# Patient Record
Sex: FEMALE | Race: WHITE | NOT HISPANIC OR LATINO | Employment: OTHER | URBAN - METROPOLITAN AREA
[De-identification: names, ages, dates, MRNs, and addresses within clinical notes are randomized per-mention and may not be internally consistent; named-entity substitution may affect disease eponyms.]

---

## 2021-05-27 ENCOUNTER — RECORDS - HEALTHEAST (OUTPATIENT)
Dept: ADMINISTRATIVE | Facility: CLINIC | Age: 76
End: 2021-05-27

## 2022-03-11 ENCOUNTER — TELEPHONE (OUTPATIENT)
Dept: GASTROENTEROLOGY | Facility: CLINIC | Age: 77
End: 2022-03-11
Payer: COMMERCIAL

## 2022-03-11 NOTE — TELEPHONE ENCOUNTER
[facility] Call Center    Phone Message    May a detailed message be left on voicemail: yes     Reason for Call: Other:       Pt called into schedule a consult for a fecal transplant based on having multiple C. Diff cases.    Please review and call back to schedule.    Action Taken: Message routed to:  [facility] GI    Travel Screening: Not Applicable

## 2022-03-12 NOTE — TELEPHONE ENCOUNTER
Per review of chart, Positive C.diff on 8/23/2021 and 9/22/2021.  No referral on file in system, however, review of Care Everywhere notes recommendation by Infectious Disease provider, Amrita Pfeiffer MD to see Dr. Saldaña for consideration of IMT.      Yumi De Jesus RN

## 2022-03-14 ENCOUNTER — TELEPHONE (OUTPATIENT)
Dept: GASTROENTEROLOGY | Facility: CLINIC | Age: 77
End: 2022-03-14
Payer: COMMERCIAL

## 2022-03-14 NOTE — TELEPHONE ENCOUNTER
Patient contacted, clarified that the upcoming visit is a consult only to determine if she is a candidate for IMT.  Advised that if it is determined that she is a good candidate, Dr. Saldaña would advise on the method to proceed - via colonoscopy or capsule ingestion.  Patient is wondering about insurance coverage and cost.   Informed patient that this writer is not aware of CPT codes, etc... but once it is determined if/how to proceed that more information could be advised on cost.  Patient verbalizes understanding and has no further questions at this time.        Yumi De Jesus RN

## 2022-03-14 NOTE — TELEPHONE ENCOUNTER
Patient contacted, confirms that she has tested positive x 2 for C.diff, but has had several recurrences since that she has been treated for, though no additional testing has been completed.  Patient scheduled for video visit at next available on 5/18/2022 at 1 PM.      Yumi De Jesus RN

## 2022-03-14 NOTE — TELEPHONE ENCOUNTER
[facility] Call Center    Phone Message    May a detailed message be left on voicemail: yes     Reason for Call: Other:     Glenys is calling with questions in regards to her procedure.  Please call back to discuss    Action Taken: Message routed to:  Adult Clinics: Gastroenterology (GI) p [id number]    Travel Screening: Not Applicable

## 2022-04-02 ENCOUNTER — HEALTH MAINTENANCE LETTER (OUTPATIENT)
Age: 77
End: 2022-04-02

## 2022-04-07 ASSESSMENT — ENCOUNTER SYMPTOMS
DYSPNEA ON EXERTION: 0
SPUTUM PRODUCTION: 1
POSTURAL DYSPNEA: 0
JAUNDICE: 0
SNORES LOUDLY: 0
BLOOD IN STOOL: 0
BLOATING: 0
DIARRHEA: 1
HEMOPTYSIS: 0
WHEEZING: 0
BOWEL INCONTINENCE: 0
COUGH DISTURBING SLEEP: 1
DIFFICULTY URINATING: 0
VOMITING: 0
FLANK PAIN: 0
ABDOMINAL PAIN: 1
DYSURIA: 0
HEMATURIA: 0
SHORTNESS OF BREATH: 0
HEARTBURN: 0
RECTAL PAIN: 0
COUGH: 1
NAUSEA: 0
CONSTIPATION: 0

## 2022-04-13 ENCOUNTER — VIRTUAL VISIT (OUTPATIENT)
Dept: GASTROENTEROLOGY | Facility: CLINIC | Age: 77
End: 2022-04-13
Payer: COMMERCIAL

## 2022-04-13 DIAGNOSIS — A04.71 RECURRENT CLOSTRIDIOIDES DIFFICILE DIARRHEA: ICD-10-CM

## 2022-04-13 DIAGNOSIS — R19.7 DIARRHEA, UNSPECIFIED TYPE: Primary | ICD-10-CM

## 2022-04-13 PROCEDURE — 99205 OFFICE O/P NEW HI 60 MIN: CPT | Mod: GT | Performed by: INTERNAL MEDICINE

## 2022-04-13 NOTE — PROGRESS NOTES
Glenys is a 76 year old who is being evaluated via a billable video visit.      How would you like to obtain your AVS? MyChart  If the video visit is dropped, the invitation should be resent by: Text to cell phone: [phone]  Will anyone else be joining your video visit? No

## 2022-04-13 NOTE — PROGRESS NOTES
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  She first developed C. difficile infection in 08/2021, which followed treatment of sinusitis with Augmentin in 07/2021.  She was treated with vancomycin, which helped, and suffered a relapse in September, once again treated with vancomycin.  Since that time, she said she had a total of 4 recurrences, although 3 out of these 4 were diagnosed based on clinical symptoms alone and not confirmed with laboratory testing.  Most recent laboratory test done on 03/29 was negative for C. difficile.  At that time, she was not taking vancomycin, although in reviewing the notes, she probably was off of it for a very short period of time.  In addition to vancomycin taper, she has been on rifaximin for 20 days.  The only other exposure to antibiotics that I see was actually that recent visit at the end of March to the Emergency Room during which time she was diagnosed with a urinary tract infection and she was given Macrobid.  At this time, the patient is off of treatment for C. difficile for 2 full weeks and she has regular bowel movements, 1 bowel movement per day.  It is noted that her diarrhea associated with C. difficile infection was actually not particularly severe.  She had 2-3 loose bowel movements per day characterized by extreme urgency, but she denies fecal incontinence.    PAST MEDICAL HISTORY:  Notable for breast cancer.  She is status post left mastectomy.  She uses tobacco.  She has hypertension.  She has history of osteopenia.  Fibromyalgia is listed on her list of diagnoses; however, she says that has resolved many years ago.  The patient has history of diverticulitis and is status post partial colon resection in 2003.  Infections are rare in her.  She says she has not been on an antibiotic for years before this Augmentin in 07/2021.    PHYSICAL  EXAMINATION:  Done over video.  She appears to be in no acute distress.  Her speech is articulate and clear.  Visible skin is normal.    ASSESSMENT AND PLAN:  At this time, I do not see any need for further intervention.  She should have a standing order for C. difficile testing.  If the symptoms do come back, she would be placed back on vancomycin and I would advocate then for an intestinal microbiota transplant.  That we would do by way of oral capsules.  The patient had a colonoscopy back in 2017.  She is not interested in another colonoscopy at this time.  Oral capsules are nearly equivalent in efficacy to colonoscopic administration of microbiota transplant.  Most relapses when they do happen occur during the second week, so it is very encouraging that at 2 weeks she has normal bowel movements.  However, we hesitate to proclaim cure until 1-2 months after the discontinuation of antibiotics.  General recommendations to facilitate recovery in optimal health of the gut microbiome are nutritional.  We encourage diverse sources of whole grains, colored vegetables, leafy greens.  We do not recommend probiotics in capsules; however, fermented foods that may contain probiotic organisms are recommended and have been shown to enhance microbial diversity in the gut.  In contrast, probiotics contain in the capsule preparations may actually delay microbiome recovery and should generally be avoided.  Unfortunately, science on probiotics is very sparse and with such great number of different preparations available on the market, it is difficult to generalize.    TOTAL TIME SPENT IN CONSULTATION:  1 hour 10 minutes.  This includes review of medical records, documentation, and coordination of care.    Braeden Saldaña MD        D: 2022   T: 2022   MT: ARIANE    Name:     MICHELLE MARTINO  MRN:      [id number]        Account:    [id number]   :      1945           Visit Date: 2022     Document:  [id number]

## 2022-05-06 ENCOUNTER — TELEPHONE (OUTPATIENT)
Dept: GASTROENTEROLOGY | Facility: CLINIC | Age: 77
End: 2022-05-06
Payer: COMMERCIAL

## 2022-05-06 DIAGNOSIS — R19.7 DIARRHEA, UNSPECIFIED TYPE: Primary | ICD-10-CM

## 2022-05-06 NOTE — TELEPHONE ENCOUNTER
Patient's message:    Jordy Saldaña,  We had a video appointment on April 13.    I have been feeling quite well since then.  No symptoms of C-Diff until yesterday, May 5.    Had severe abdominal pain.    No diarrhea.  Today pain is much less and feel well but did have 2 small diarrhea episodes.  I am very dismayed about this seeming recurrence of C- Diff.   Not sure if I need to do anything yet like go back on vancomycin or have a stool sample checked out.   Or do I wait and see if it gets better?   If I need a sample analyzed I would like the order sent to [facility] in [place] and any prescription sent to [facility] in [place].   Please advise what you think I should do , or just wait .   Please advise.   Thank you  Glenys Schmitz  [phone]  Sent from my iPhone    She needs to get her stool tested.    Braeden Saldaña MD

## 2022-05-09 ENCOUNTER — DOCUMENTATION ONLY (OUTPATIENT)
Dept: GASTROENTEROLOGY | Facility: CLINIC | Age: 77
End: 2022-05-09
Payer: COMMERCIAL

## 2022-05-09 NOTE — PROGRESS NOTES
Lab orders sent to [facility] lab ([phone]) last Friday, 5/6/2022.   Staff message sent from Dr. Saldaña today stating fax was not received.   Called [facility] lab - confirmed fax number and re-faxed orders to [phone].       Demetria Andrade RN

## 2022-05-10 NOTE — PROGRESS NOTES
Called [facility] lab to verify that lab orders were received.   Staff confirmed that orders were received.     Demetria Andrade RN

## 2022-05-12 ENCOUNTER — TELEPHONE (OUTPATIENT)
Dept: GASTROENTEROLOGY | Facility: CLINIC | Age: 77
End: 2022-05-12
Payer: COMMERCIAL

## 2022-05-12 NOTE — TELEPHONE ENCOUNTER
Lab results for C diff and Calprotectin done on 5/10/2022. Care Everywhere updated.   Forwarding to provider for review. Staff message also sent.     Demetria Andrade RN

## 2022-07-15 ENCOUNTER — TELEPHONE (OUTPATIENT)
Dept: GASTROENTEROLOGY | Facility: CLINIC | Age: 77
End: 2022-07-15

## 2022-07-15 DIAGNOSIS — R19.7 DIARRHEA, UNSPECIFIED TYPE: Primary | ICD-10-CM

## 2022-07-15 NOTE — TELEPHONE ENCOUNTER
Braeden Saldaña MD  P [facility]enter[facility]-Adult[facility]  Please fax the stool test orders to the [facility] in [place] and let the patient know that they are faxed.     I recommended her to go to the ER. However, if she prefers to submit a stool test, their lab should call me during the weekend with the results..     Contacted [facility], provided with fax number of [phone].  Faxed ordered stool tests per provider request with provider number to contact with results as available over the weekend. Patient contacted, states she will do this.  Patient advised if the lab claims to have not received the orders or if unable to tolerate PO intake or if suspecting dehydration to be evaluated at the ER.  Patient verbalizes understanding and agrees.    Yumi De Jesus RN

## 2022-07-15 NOTE — TELEPHONE ENCOUNTER
"Patient's message:    \"Hello.   I have been very sick the past 48 hours with an apparent return of c-diff.   Havent been able to eat or drink more than a few ounces of water. Raging diarrhea and vomiting.   I think I need to at least get stool test done again.   Having lots of pain as well.  I ve been fine since April but I m sure c- diff is back.   If you could order the test pleas send to [facility] in [place].   Let me know what you think and what I can do.    I m miserable.     Thank you  Glenys Schmitz.\"     Stool tests are ordered. However, the patient is advised to go to the ER given severity of symptoms.    Braeden Saldaña MD  "

## 2022-07-16 ENCOUNTER — TELEPHONE (OUTPATIENT)
Dept: GASTROENTEROLOGY | Facility: CLINIC | Age: 77
End: 2022-07-16

## 2022-07-16 DIAGNOSIS — A04.71 RECURRENT CLOSTRIDIOIDES DIFFICILE DIARRHEA: Primary | ICD-10-CM

## 2022-07-16 RX ORDER — VANCOMYCIN HYDROCHLORIDE 125 MG/1
125 CAPSULE ORAL 4 TIMES DAILY
Qty: 32 CAPSULE | Refills: 0 | Status: SHIPPED | OUTPATIENT
Start: 2022-07-16

## 2022-07-16 NOTE — TELEPHONE ENCOUNTER
The patient reports that the C. Diff test was positive. She is started back on vancomycin and will be receiving IMT via oral capsules after that.

## 2022-08-06 DIAGNOSIS — A04.71 RECURRENT CLOSTRIDIOIDES DIFFICILE DIARRHEA: Primary | ICD-10-CM

## 2022-08-06 RX ORDER — VANCOMYCIN HYDROCHLORIDE 125 MG/1
125 CAPSULE ORAL 4 TIMES DAILY
Qty: 40 CAPSULE | Refills: 0 | Status: SHIPPED | OUTPATIENT
Start: 2022-08-06

## 2022-08-16 ENCOUNTER — ALLIED HEALTH/NURSE VISIT (OUTPATIENT)
Dept: GASTROENTEROLOGY | Facility: CLINIC | Age: 77
End: 2022-08-16
Payer: COMMERCIAL

## 2022-08-16 DIAGNOSIS — A04.71 RECURRENT CLOSTRIDIOIDES DIFFICILE DIARRHEA: Primary | ICD-10-CM

## 2022-08-16 PROCEDURE — 99207 PR NO CHARGE LOS: CPT | Performed by: INTERNAL MEDICINE

## 2022-08-17 NOTE — PROGRESS NOTES
Home visit conducted on 8/16/22 to facilitate Intestinal Microbiota Transplant via oral capsules.  Instructions and clinical consent form were reviewed at length; pt signed the ICF and expressed understanding of the instructions. Last dose vancomycin 8/13; plan for capsule ingestion 8/16/2022    Donor ID 70, 9.0 x 10^11 cells contained in 5 enteric capsules. Lot prepared 11/18/20    Plan for follow up call in 1-2 weeks.

## 2022-08-26 ENCOUNTER — TELEPHONE (OUTPATIENT)
Dept: GASTROENTEROLOGY | Facility: CLINIC | Age: 77
End: 2022-08-26

## 2022-08-26 DIAGNOSIS — A04.71 RECURRENT CLOSTRIDIOIDES DIFFICILE DIARRHEA: Primary | ICD-10-CM

## 2022-08-26 RX ORDER — VANCOMYCIN HYDROCHLORIDE 125 MG/1
125 CAPSULE ORAL 4 TIMES DAILY
Qty: 40 CAPSULE | Refills: 0 | Status: SHIPPED | OUTPATIENT
Start: 2022-08-26

## 2022-08-26 NOTE — TELEPHONE ENCOUNTER
[facility] Call Center    Phone Message    May a detailed message be left on voicemail: yes     Reason for Call: Other: Patient was returning a phone call from Demetria PITTMAN and is requesting a call back to discuss the stool sample test she has been requesting. Please call her back at [phone], thank you!     Action Taken: Message routed to:  Other: Demetria Andrade RN    Travel Screening: Not Applicable

## 2022-08-26 NOTE — TELEPHONE ENCOUNTER
Positive C.diff results received.  Patient contacted, states she did quite well for the first 8 days or so after IMT, and prior to the IMT was having normal stool patters while on Vancomycin.  Starting Wednesday night has been having 12-13 loose, watery stools each day and today has had 12 or so thus far.   Ok to treat with Vancomycin 125 mg four times daily per Dr. Rosen.  Patient advised that Dr. Saldaña will determine plan moving forward when he returns.      Rx to patient's preferred pharmacy.     Yumi De Jesus RN

## 2022-08-26 NOTE — TELEPHONE ENCOUNTER
Spoke with patient - state that she has had loose, watery stools since middle of the night on Wednesday along with cramping and pain. No blood noted in the stools. Not much of appetite, but did have some toast this AM.   IMT on 8/16/2022.   Writer will place orders for Cdiff and calprotectin per protocol.   Patient states she will go to the lab at [facility] in [place] (fax: [phone]).     Demetria Andrade RN

## 2022-08-28 DIAGNOSIS — A04.71 RECURRENT CLOSTRIDIOIDES DIFFICILE DIARRHEA: ICD-10-CM

## 2022-08-28 RX ORDER — VANCOMYCIN HYDROCHLORIDE 125 MG/1
125 CAPSULE ORAL 4 TIMES DAILY
Qty: 56 CAPSULE | Refills: 0 | Status: SHIPPED | OUTPATIENT
Start: 2022-08-28

## 2022-08-29 NOTE — TELEPHONE ENCOUNTER
Per review of chart, Dr. Saldaña has placed another Vancomycin order and has ordered a Zinplava infusion.  Message has been sent to [employer] [employer] for Zinplava.      Yumi De Jesus RN

## 2022-09-19 ENCOUNTER — TELEPHONE (OUTPATIENT)
Dept: GASTROENTEROLOGY | Facility: CLINIC | Age: 77
End: 2022-09-19

## 2022-09-19 NOTE — TELEPHONE ENCOUNTER
"Received notification from infusion Stephane that patients Zinplava infusion has been approved. LPN spoke with patient and notified her that the infusion has been approved, however, patient needs to schedule the infusion before Dr. Saldaña can give further instructions as to when the Vancomycin needs to be stopped and scheduling a repeat IMT. Patient verbalized understanding and asked if the infusion could be done closer to her home. LPN stated \"we can check into this, but it could take a few days to hear back.\" Patient stated that [place] was fine. LPN provided patient with the infusion scheduling number and stated that once we see she is scheduled, then Dr. Saldaña will give further instructions. Patient verbalized understanding.     BOB Maya Brendon Lacher, Stacy, LPN; P Adv Therapies ; P [facility]Ad[facility]  Good morning all,     PA approved!     Thank you,   Stephane Lugo    - Infusion Therapy    [facility]    [email or web address]  [email or web address]       "

## 2022-09-20 DIAGNOSIS — A04.71 RECURRENT CLOSTRIDIOIDES DIFFICILE DIARRHEA: Primary | ICD-10-CM

## 2022-09-20 RX ORDER — VANCOMYCIN HYDROCHLORIDE 125 MG/1
125 CAPSULE ORAL DAILY
Qty: 30 CAPSULE | Refills: 0 | Status: SHIPPED | OUTPATIENT
Start: 2022-09-20

## 2022-09-25 ENCOUNTER — HEALTH MAINTENANCE LETTER (OUTPATIENT)
Age: 77
End: 2022-09-25

## 2022-10-13 DIAGNOSIS — A04.71 RECURRENT CLOSTRIDIOIDES DIFFICILE DIARRHEA: Primary | ICD-10-CM

## 2022-10-13 RX ORDER — HEPARIN SODIUM,PORCINE 10 UNIT/ML
5 VIAL (ML) INTRAVENOUS
Status: CANCELLED | OUTPATIENT
Start: 2022-10-13

## 2022-10-13 RX ORDER — METHYLPREDNISOLONE SODIUM SUCCINATE 125 MG/2ML
125 INJECTION, POWDER, LYOPHILIZED, FOR SOLUTION INTRAMUSCULAR; INTRAVENOUS
Status: CANCELLED
Start: 2022-10-13

## 2022-10-13 RX ORDER — EPINEPHRINE 1 MG/ML
0.3 INJECTION, SOLUTION, CONCENTRATE INTRAVENOUS EVERY 5 MIN PRN
Status: CANCELLED | OUTPATIENT
Start: 2022-10-13

## 2022-10-13 RX ORDER — ALBUTEROL SULFATE 0.83 MG/ML
2.5 SOLUTION RESPIRATORY (INHALATION)
Status: CANCELLED | OUTPATIENT
Start: 2022-10-13

## 2022-10-13 RX ORDER — ACETAMINOPHEN 325 MG/1
650 TABLET ORAL
Status: CANCELLED
Start: 2022-10-13

## 2022-10-13 RX ORDER — HEPARIN SODIUM (PORCINE) LOCK FLUSH IV SOLN 100 UNIT/ML 100 UNIT/ML
5 SOLUTION INTRAVENOUS
Status: CANCELLED | OUTPATIENT
Start: 2022-10-13

## 2022-10-13 RX ORDER — ALBUTEROL SULFATE 90 UG/1
1-2 AEROSOL, METERED RESPIRATORY (INHALATION)
Status: CANCELLED
Start: 2022-10-13

## 2022-10-13 RX ORDER — MEPERIDINE HYDROCHLORIDE 25 MG/ML
25 INJECTION INTRAMUSCULAR; INTRAVENOUS; SUBCUTANEOUS EVERY 30 MIN PRN
Status: CANCELLED | OUTPATIENT
Start: 2022-10-13

## 2022-10-13 RX ORDER — DIPHENHYDRAMINE HYDROCHLORIDE 50 MG/ML
50 INJECTION INTRAMUSCULAR; INTRAVENOUS
Status: CANCELLED
Start: 2022-10-13

## 2022-10-17 ENCOUNTER — INFUSION THERAPY VISIT (OUTPATIENT)
Dept: INFUSION THERAPY | Facility: CLINIC | Age: 77
End: 2022-10-17
Payer: COMMERCIAL

## 2022-10-17 VITALS
OXYGEN SATURATION: 98 % | SYSTOLIC BLOOD PRESSURE: 167 MMHG | DIASTOLIC BLOOD PRESSURE: 73 MMHG | WEIGHT: 111.2 LBS | RESPIRATION RATE: 16 BRPM | HEART RATE: 68 BPM | TEMPERATURE: 97.8 F

## 2022-10-17 DIAGNOSIS — A04.71 RECURRENT CLOSTRIDIOIDES DIFFICILE DIARRHEA: Primary | ICD-10-CM

## 2022-10-17 PROCEDURE — 99207 PR NO CHARGE LOS: CPT

## 2022-10-17 PROCEDURE — 96365 THER/PROPH/DIAG IV INF INIT: CPT | Performed by: NURSE PRACTITIONER

## 2022-10-17 RX ORDER — ALBUTEROL SULFATE 90 UG/1
1-2 AEROSOL, METERED RESPIRATORY (INHALATION)
Status: CANCELLED
Start: 2022-10-17

## 2022-10-17 RX ORDER — ACETAMINOPHEN 325 MG/1
650 TABLET ORAL
Status: CANCELLED
Start: 2022-10-17

## 2022-10-17 RX ORDER — ALBUTEROL SULFATE 0.83 MG/ML
2.5 SOLUTION RESPIRATORY (INHALATION)
Status: CANCELLED | OUTPATIENT
Start: 2022-10-17

## 2022-10-17 RX ORDER — EPINEPHRINE 1 MG/ML
0.3 INJECTION, SOLUTION INTRAMUSCULAR; SUBCUTANEOUS EVERY 5 MIN PRN
Status: CANCELLED | OUTPATIENT
Start: 2022-10-17

## 2022-10-17 RX ORDER — LISINOPRIL 10 MG/1
10 TABLET ORAL DAILY
COMMUNITY

## 2022-10-17 RX ORDER — MEPERIDINE HYDROCHLORIDE 25 MG/ML
25 INJECTION INTRAMUSCULAR; INTRAVENOUS; SUBCUTANEOUS EVERY 30 MIN PRN
Status: CANCELLED | OUTPATIENT
Start: 2022-10-17

## 2022-10-17 RX ORDER — ATENOLOL 100 MG/1
100 TABLET ORAL DAILY
COMMUNITY

## 2022-10-17 RX ORDER — METHYLPREDNISOLONE SODIUM SUCCINATE 125 MG/2ML
125 INJECTION, POWDER, LYOPHILIZED, FOR SOLUTION INTRAMUSCULAR; INTRAVENOUS
Status: CANCELLED
Start: 2022-10-17

## 2022-10-17 RX ORDER — HEPARIN SODIUM (PORCINE) LOCK FLUSH IV SOLN 100 UNIT/ML 100 UNIT/ML
5 SOLUTION INTRAVENOUS
Status: CANCELLED | OUTPATIENT
Start: 2022-10-17

## 2022-10-17 RX ORDER — DIPHENHYDRAMINE HYDROCHLORIDE 50 MG/ML
50 INJECTION INTRAMUSCULAR; INTRAVENOUS
Status: CANCELLED
Start: 2022-10-17

## 2022-10-17 RX ORDER — HEPARIN SODIUM,PORCINE 10 UNIT/ML
5 VIAL (ML) INTRAVENOUS
Status: CANCELLED | OUTPATIENT
Start: 2022-10-17

## 2022-10-17 RX ADMIN — Medication 250 ML: at 09:53

## 2022-10-17 ASSESSMENT — PAIN SCALES - GENERAL: PAINLEVEL: NO PAIN (0)

## 2022-10-17 NOTE — PROGRESS NOTES
Infusion Nursing Note:  Glenys Schmitz presents today for Zinplava.    Patient seen by provider today: No   present during visit today: Not Applicable.    Note: This is the pts first time to [facility]. Orientation provided to infusion center, pt also instructed on how and when to use her call light. Zinplava handout printed out, reviewed with, and given to the patient. Questions answered to pt satisfaction. The patient reports feeling well today and denies any concerns. She reports dealing with C-Diff infections on and off for the past year and will be having a stool transplant tomorrow. She denies having diarrhea today and reports taking her last dose of Vanco this morning.     Intravenous Access:  Peripheral IV placed.    Treatment Conditions:  Not Applicable.    Post Infusion Assessment:  Patient tolerated infusion without incident.  Site patent and intact, free from redness, edema or discomfort.  No evidence of extravasations.  Access discontinued per protocol.     Discharge Plan:   AVS to patient via MYCHART.  Patient will return as directed by her provider.   Patient discharged in stable condition accompanied by: self and son.  Departure Mode: Ambulatory.      Precious Grace RN

## 2022-10-19 ENCOUNTER — ALLIED HEALTH/NURSE VISIT (OUTPATIENT)
Dept: GASTROENTEROLOGY | Facility: CLINIC | Age: 77
End: 2022-10-19
Payer: COMMERCIAL

## 2022-10-19 DIAGNOSIS — A04.71 RECURRENT CLOSTRIDIOIDES DIFFICILE DIARRHEA: Primary | ICD-10-CM

## 2022-10-19 PROCEDURE — 99207 PR NO CHARGE LOS: CPT | Performed by: INTERNAL MEDICINE

## 2022-10-19 NOTE — PROGRESS NOTES
Pt was seen at home on October 18, 2022 to assist with Intestinal Microbiota Transplant via oral capsules following Zinplava infusion. Instructions were reviewed and pt expressed understanding.  Plan for capsule ingestion 10/19/2022   donor , 6.1 x 10^11 cells, lot prepared on 02Apr21 by the   [facility] [employer] [facility]

## 2023-05-13 ENCOUNTER — HEALTH MAINTENANCE LETTER (OUTPATIENT)
Age: 78
End: 2023-05-13

## 2024-07-20 ENCOUNTER — HEALTH MAINTENANCE LETTER (OUTPATIENT)
Age: 79
End: 2024-07-20

## 2025-08-09 ENCOUNTER — HEALTH MAINTENANCE LETTER (OUTPATIENT)
Age: 80
End: 2025-08-09